# Patient Record
Sex: FEMALE | Race: WHITE | URBAN - METROPOLITAN AREA
[De-identification: names, ages, dates, MRNs, and addresses within clinical notes are randomized per-mention and may not be internally consistent; named-entity substitution may affect disease eponyms.]

---
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
## 2019-03-15 ENCOUNTER — WALK IN (OUTPATIENT)
Dept: URGENT CARE | Age: 19
End: 2019-03-15

## 2019-03-15 VITALS
DIASTOLIC BLOOD PRESSURE: 68 MMHG | BODY MASS INDEX: 27.6 KG/M2 | TEMPERATURE: 98.3 F | WEIGHT: 150 LBS | SYSTOLIC BLOOD PRESSURE: 118 MMHG | RESPIRATION RATE: 18 BRPM | HEART RATE: 72 BPM | HEIGHT: 62 IN

## 2019-03-15 DIAGNOSIS — H10.023 PINK EYE DISEASE OF BOTH EYES: ICD-10-CM

## 2019-03-15 DIAGNOSIS — H66.92 LEFT OTITIS MEDIA, UNSPECIFIED OTITIS MEDIA TYPE: Primary | ICD-10-CM

## 2019-03-15 PROCEDURE — 99203 OFFICE O/P NEW LOW 30 MIN: CPT | Performed by: NURSE PRACTITIONER

## 2019-03-15 RX ORDER — GENTAMICIN SULFATE 3 MG/ML
1 SOLUTION/ DROPS OPHTHALMIC 3 TIMES DAILY
Qty: 5 ML | Refills: 0 | Status: SHIPPED | OUTPATIENT
Start: 2019-03-15 | End: 2019-03-20

## 2019-03-15 RX ORDER — AZITHROMYCIN 250 MG/1
TABLET, FILM COATED ORAL
Qty: 6 TABLET | Refills: 0 | Status: SHIPPED | OUTPATIENT
Start: 2019-03-15 | End: 2019-03-20

## 2019-03-15 ASSESSMENT — ENCOUNTER SYMPTOMS
CONSTITUTIONAL NEGATIVE: 1
GASTROINTESTINAL NEGATIVE: 1
NEUROLOGICAL NEGATIVE: 1
COUGH: 1
PHOTOPHOBIA: 0
EYE REDNESS: 1
EYE DISCHARGE: 1

## 2019-08-13 ENCOUNTER — OFFICE VISIT (OUTPATIENT)
Dept: FAMILY MEDICINE CLINIC | Facility: CLINIC | Age: 19
End: 2019-08-13
Payer: COMMERCIAL

## 2019-08-13 VITALS
RESPIRATION RATE: 14 BRPM | TEMPERATURE: 98 F | BODY MASS INDEX: 26.82 KG/M2 | HEIGHT: 62.5 IN | HEART RATE: 80 BPM | DIASTOLIC BLOOD PRESSURE: 74 MMHG | SYSTOLIC BLOOD PRESSURE: 110 MMHG | WEIGHT: 149.5 LBS

## 2019-08-13 DIAGNOSIS — L23.9 ALLERGIC DERMATITIS: Primary | ICD-10-CM

## 2019-08-13 PROCEDURE — 99213 OFFICE O/P EST LOW 20 MIN: CPT | Performed by: NURSE PRACTITIONER

## 2019-08-13 RX ORDER — NORETHINDRONE ACETATE AND ETHINYL ESTRADIOL AND FERROUS FUMARATE 1MG-20(21)
1 KIT ORAL DAILY
Refills: 2 | COMMUNITY
Start: 2019-07-21

## 2019-08-13 NOTE — PROGRESS NOTES
Augie Rodriguez is a 20year old female.   Patient presents with:  Rash: Started in scalp/eyes, now in armpits - keeps spreading      HPI:   Complaints of rash for the past 2 years=- has moved around- I now on her armpits-   Is itchy- charles if seaty   Has had it o apparent distress  SKIN: Axilla–mild, confluent erythematous rash to axilla bilaterally–no satellite lesions, no signs of infection, no discharge.    LUNGS: normal rate without respiratory distress, lungs clear to auscultation  CARDIO: RRR without murmur, n

## 2019-08-13 NOTE — PATIENT INSTRUCTIONS
Apply cream sparingly to rash areas- do not use on your face,     Follow up if symptoms persist or increase

## 2019-11-11 ENCOUNTER — OFFICE VISIT (OUTPATIENT)
Dept: FAMILY MEDICINE CLINIC | Facility: CLINIC | Age: 19
End: 2019-11-11
Payer: COMMERCIAL

## 2019-11-11 VITALS
HEART RATE: 114 BPM | OXYGEN SATURATION: 96 % | RESPIRATION RATE: 16 BRPM | TEMPERATURE: 100 F | BODY MASS INDEX: 27 KG/M2 | DIASTOLIC BLOOD PRESSURE: 64 MMHG | SYSTOLIC BLOOD PRESSURE: 110 MMHG | WEIGHT: 148.63 LBS

## 2019-11-11 DIAGNOSIS — J02.9 SORE THROAT: Primary | ICD-10-CM

## 2019-11-11 PROCEDURE — 87880 STREP A ASSAY W/OPTIC: CPT | Performed by: NURSE PRACTITIONER

## 2019-11-11 PROCEDURE — 99214 OFFICE O/P EST MOD 30 MIN: CPT | Performed by: NURSE PRACTITIONER

## 2019-11-11 RX ORDER — AZITHROMYCIN 500 MG/1
500 TABLET, FILM COATED ORAL DAILY
Qty: 5 TABLET | Refills: 0 | Status: SHIPPED | OUTPATIENT
Start: 2019-11-11 | End: 2019-11-16

## 2019-11-11 NOTE — PROGRESS NOTES
HPI:    Patient ID: Anya Mark is a 20year old female. Patient presents to the clinic today with complaints of a sore throat and enlarged tonsils. Symptoms started  Saturday night with a sore throat and worsening pain.  Reports low energy, low appetite, dry.   Psychiatric: She has a normal mood and affect. Her behavior is normal.   Nursing note and vitals reviewed.              ASSESSMENT/PLAN:   Sore throat  (primary encounter diagnosis)    Orders Placed This Encounter      Rapid Strep      Meds This Visi

## 2019-11-11 NOTE — PATIENT INSTRUCTIONS
Take acetaminophen or ibuprofen for fever/discomfort  Drink plenty of fluids, warm liquids  Decongestants for congestion  Expectorant and/or cough suppressant  Use saline drops as needed or use neti pot/rinse (with distilled water)  Use cool mist vaporiz · Symptoms that get worse or new symptoms   · Symptoms that go away and come back  · Trouble swallowing  · Inability to eat or drink, or refusing food and drink  · Trouble breathing  · Excessive drooling  · Trouble opening the mouth  · Neck stiffness  · Bl Side effects that you should report to your doctor or health care professional as soon as possible:  · allergic reactions like skin rash, itching or hives, swelling of the face, lips, or tongue  · bloody or watery diarrhea  · breathing problems  · chest pa · liver disease  · myasthenia gravis  · an unusual or allergic reaction to azithromycin, erythromycin, other macrolide antibiotics, foods, dyes, or preservatives  · pregnant or trying to get pregnant  · breast-feeding  What should I watch for while using t

## 2020-01-29 ENCOUNTER — OFFICE VISIT (OUTPATIENT)
Dept: FAMILY MEDICINE CLINIC | Facility: CLINIC | Age: 20
End: 2020-01-29
Payer: COMMERCIAL

## 2020-01-29 VITALS
DIASTOLIC BLOOD PRESSURE: 78 MMHG | TEMPERATURE: 99 F | RESPIRATION RATE: 16 BRPM | HEIGHT: 62.5 IN | BODY MASS INDEX: 26.74 KG/M2 | WEIGHT: 149 LBS | SYSTOLIC BLOOD PRESSURE: 94 MMHG | HEART RATE: 106 BPM

## 2020-01-29 DIAGNOSIS — K52.9 GASTROENTERITIS: Primary | ICD-10-CM

## 2020-01-29 PROCEDURE — 99213 OFFICE O/P EST LOW 20 MIN: CPT | Performed by: NURSE PRACTITIONER

## 2020-01-29 RX ORDER — CICLOPIROX 1 G/100ML
SHAMPOO TOPICAL
Refills: 1 | COMMUNITY
Start: 2019-10-22 | End: 2021-09-22

## 2020-01-29 RX ORDER — ONDANSETRON 4 MG/1
4 TABLET, FILM COATED ORAL EVERY 8 HOURS PRN
Qty: 10 TABLET | Refills: 0 | Status: SHIPPED | OUTPATIENT
Start: 2020-01-29 | End: 2020-03-03

## 2020-01-29 RX ORDER — MOMETASONE FUROATE 1 MG/G
CREAM TOPICAL
Refills: 0 | COMMUNITY
Start: 2019-10-22 | End: 2020-08-21

## 2020-01-29 RX ORDER — FLUOCINONIDE 0.5 MG/ML
SOLUTION TOPICAL
Refills: 1 | COMMUNITY
Start: 2019-10-22 | End: 2021-09-22

## 2020-01-29 NOTE — PATIENT INSTRUCTIONS
Your vomiting and diarrhea are most likely viral.  Important that you let your body get rid of all the toxin, over-the-counter antidiarrheals as this may make your condition worse.   I have prescribed you Zofran, please only use this if nausea becomes Denice Mar-Mac and Connie Follow all instructions given by your healthcare provider. Rest at home for the next 24 hours, or until you feel better. Avoid caffeine, tobacco, and alcohol. These can make diarrhea, cramping, and pain worse.   If taking medicines:  · Over-the-counter naus · Don’t eat raw or undercooked eggs (poached or abdulaziz side up), poultry, meat, or unpasteurized milk and juices. Food and drinks  The main goal while treating vomiting or diarrhea is to prevent dehydration.  This is done by taking small amounts of liquids Follow up with your healthcare provider, or as advised. If a stool sample was taken or cultures were done, call the healthcare provider for the results as instructed.   Call 911  Call 911 if you have any of these symptoms:  · Trouble breathing  · Confusion

## 2020-01-29 NOTE — PROGRESS NOTES
HPI:    Patient ID: Radha Crocker is a 20year old female. Patient presents to the clinic today with complaints of vomiting and diarrhea. States that she has been vomiting since last night, has vomited 10 times, Diarrhea about 8 times.   Has some abdominal needed. 0   • Ondansetron HCl (ZOFRAN) 4 mg tablet Take 1 tablet (4 mg total) by mouth every 8 (eight) hours as needed for Nausea. 10 tablet 0   • JUNEL FE 1/20 1-20 MG-MCG Oral Tab Take 1 tablet by mouth daily.   2     Allergies:  Amoxicillin hours as needed for Nausea.        Imaging & Referrals:  None       [id number]

## 2020-02-26 ENCOUNTER — OFFICE VISIT (OUTPATIENT)
Dept: FAMILY MEDICINE CLINIC | Facility: CLINIC | Age: 20
End: 2020-02-26
Payer: COMMERCIAL

## 2020-02-26 VITALS
RESPIRATION RATE: 16 BRPM | HEIGHT: 62.5 IN | WEIGHT: 148 LBS | DIASTOLIC BLOOD PRESSURE: 62 MMHG | HEART RATE: 88 BPM | SYSTOLIC BLOOD PRESSURE: 100 MMHG | OXYGEN SATURATION: 95 % | TEMPERATURE: 97 F | BODY MASS INDEX: 26.55 KG/M2

## 2020-02-26 DIAGNOSIS — L73.9 FOLLICULITIS: Primary | ICD-10-CM

## 2020-02-26 PROCEDURE — 99213 OFFICE O/P EST LOW 20 MIN: CPT | Performed by: NURSE PRACTITIONER

## 2020-02-26 RX ORDER — SULFAMETHOXAZOLE AND TRIMETHOPRIM 800; 160 MG/1; MG/1
1 TABLET ORAL 2 TIMES DAILY
COMMUNITY
Start: 2020-02-23 | End: 2020-03-03 | Stop reason: ALTCHOICE

## 2020-02-26 NOTE — PATIENT INSTRUCTIONS
Use bacitracin topically at least twice a day. If not improving, consider a course of Valtrex. Otherwise follow-up as needed.

## 2020-02-26 NOTE — PROGRESS NOTES
HPI:    Patient ID: Shyam Rodriguez is a 20year old female. HPI     Rash to the pelvic area since Saturday or Sunday. Rash is burning not itching. Notes the burning with urination as well as when touched. No hx of cold sores. Urine has been cloudy.  Was No prescriptions requested or ordered in this encounter       Imaging & Referrals:  None      Patient Instructions   Use bacitracin topically at least twice a day. If not improving, consider a course of Valtrex. Otherwise follow-up as needed.

## 2020-03-03 ENCOUNTER — OFFICE VISIT (OUTPATIENT)
Dept: FAMILY MEDICINE CLINIC | Facility: CLINIC | Age: 20
End: 2020-03-03
Payer: COMMERCIAL

## 2020-03-03 ENCOUNTER — TELEPHONE (OUTPATIENT)
Dept: FAMILY MEDICINE CLINIC | Facility: CLINIC | Age: 20
End: 2020-03-03

## 2020-03-03 VITALS
WEIGHT: 147.38 LBS | OXYGEN SATURATION: 98 % | DIASTOLIC BLOOD PRESSURE: 70 MMHG | HEART RATE: 92 BPM | HEIGHT: 62.5 IN | SYSTOLIC BLOOD PRESSURE: 120 MMHG | BODY MASS INDEX: 26.44 KG/M2 | RESPIRATION RATE: 16 BRPM | TEMPERATURE: 98 F

## 2020-03-03 DIAGNOSIS — L50.9 HIVES: ICD-10-CM

## 2020-03-03 DIAGNOSIS — T78.40XA ALLERGIC REACTION TO DRUG, INITIAL ENCOUNTER: Primary | ICD-10-CM

## 2020-03-03 PROCEDURE — 99214 OFFICE O/P EST MOD 30 MIN: CPT | Performed by: FAMILY MEDICINE

## 2020-03-03 RX ORDER — PREDNISONE 20 MG/1
20 TABLET ORAL 2 TIMES DAILY
Qty: 10 TABLET | Refills: 0 | Status: SHIPPED | OUTPATIENT
Start: 2020-03-03 | End: 2020-03-08

## 2020-03-03 NOTE — TELEPHONE ENCOUNTER
poss med reaction to antibiotic prescribed by Urgent care 2/23    [facility] /  lulu       broke out in hives and now worsening  - stopped taking RX 3/1 - broke out on Monday 3/2

## 2020-03-03 NOTE — TELEPHONE ENCOUNTER
Pt was seen at urgent care on 2/23- treated for UTI. Pt states she was given Bactrim-  One week course. Pt then was seen per CS- on 1/26- seen for folliculitis-  Pt states she was told to complete course of antx.   Pt states she finished her antx on Sun

## 2020-03-03 NOTE — PATIENT INSTRUCTIONS
Allergic reaction-- SULFA, Bactrum    rec claritin once a  Morning    rec prednisone for 5 days    Hydrate well    Ok for benadryl at bedtime if needed for itching

## 2020-03-04 NOTE — PROGRESS NOTES
Dariel Pickett is a 20year old female. Patient presents with:  Hives: chest,legs,arms,back areas  Redness: face      HPI:   Patient presents for evaluation of a rash. Patient initially treated at urgent care with Bactrim.   Patient developed a rash on her Yes Yes/No    Kit Lot # [id number] Numeric    Kit Expiration Date 2/28/2021 Date     REVIEW OF SYSTEMS:   GENERAL HEALTH: feels well otherwise  SKIN  see HPI  RESPIRATORY: denies cough or shortness of breath  CARDIOVASCULAR: denies chest pain  GI: denies a

## 2020-07-08 ENCOUNTER — TELEPHONE (OUTPATIENT)
Dept: FAMILY MEDICINE CLINIC | Facility: CLINIC | Age: 20
End: 2020-07-08

## 2020-07-08 NOTE — TELEPHONE ENCOUNTER
Spoke with pt. Pt interested in starting her HPV series. Pt is now 20- pt informed that she would need to have exam and pap. Pt informed vaccine to be discussed at time of appt. Pt verbalized understanding.     Future Appointments   Date Time Provider D

## 2020-08-21 ENCOUNTER — OFFICE VISIT (OUTPATIENT)
Dept: FAMILY MEDICINE CLINIC | Facility: CLINIC | Age: 20
End: 2020-08-21
Payer: COMMERCIAL

## 2020-08-21 VITALS
DIASTOLIC BLOOD PRESSURE: 70 MMHG | WEIGHT: 160.81 LBS | HEART RATE: 108 BPM | RESPIRATION RATE: 16 BRPM | BODY MASS INDEX: 28.85 KG/M2 | TEMPERATURE: 99 F | OXYGEN SATURATION: 98 % | SYSTOLIC BLOOD PRESSURE: 112 MMHG | HEIGHT: 62.5 IN

## 2020-08-21 DIAGNOSIS — Z30.41 ENCOUNTER FOR SURVEILLANCE OF CONTRACEPTIVE PILLS: ICD-10-CM

## 2020-08-21 DIAGNOSIS — Z00.00 ANNUAL PHYSICAL EXAM: Primary | ICD-10-CM

## 2020-08-21 DIAGNOSIS — Z23 NEED FOR VACCINATION: ICD-10-CM

## 2020-08-21 PROCEDURE — 90471 IMMUNIZATION ADMIN: CPT | Performed by: NURSE PRACTITIONER

## 2020-08-21 PROCEDURE — 3078F DIAST BP <80 MM HG: CPT | Performed by: NURSE PRACTITIONER

## 2020-08-21 PROCEDURE — 90471 IMMUNIZATION ADMIN: CPT | Performed by: FAMILY MEDICINE

## 2020-08-21 PROCEDURE — 99395 PREV VISIT EST AGE 18-39: CPT | Performed by: FAMILY MEDICINE

## 2020-08-21 PROCEDURE — 90715 TDAP VACCINE 7 YRS/> IM: CPT | Performed by: FAMILY MEDICINE

## 2020-08-21 PROCEDURE — 90651 9VHPV VACCINE 2/3 DOSE IM: CPT | Performed by: NURSE PRACTITIONER

## 2020-08-21 PROCEDURE — 3074F SYST BP LT 130 MM HG: CPT | Performed by: NURSE PRACTITIONER

## 2020-08-21 PROCEDURE — 3008F BODY MASS INDEX DOCD: CPT | Performed by: NURSE PRACTITIONER

## 2020-08-21 PROCEDURE — 90472 IMMUNIZATION ADMIN EACH ADD: CPT | Performed by: NURSE PRACTITIONER

## 2020-08-21 NOTE — PROGRESS NOTES
CC: Annual Physical Exam    HPI:   Sudha Claudio is a 23year old female who presents for a complete physical exam.    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 Weight as of this encounter: 160 lb 12.8 oz (72.9 kg). Vital signs reviewed. Appears stated age, well groomed.   Physical Exa diagnosis)  Need for vaccination  Encounter for surveillance of contraceptive pills    Patient Instructions   rec 2 vaccines today-- HPV and Tdap    Continue yearly gyne care               Discussed diet and exercise.     Pt' s weight is Body mass index is

## 2020-08-24 ENCOUNTER — TELEPHONE (OUTPATIENT)
Dept: FAMILY MEDICINE CLINIC | Facility: CLINIC | Age: 20
End: 2020-08-24

## 2020-08-24 NOTE — TELEPHONE ENCOUNTER
Pt had wellness exam on 8/21/20. HPV #1 vaccine given at that time. pt will need 3 dose series. Order request noted for appointment for second injection.     Future Appointments   Date Time Provider Shannon Carrasco   10/20/2020 11:00 AM MARCO A GIVENS

## 2020-08-24 NOTE — TELEPHONE ENCOUNTER
Pt was seen for wellness visit with CR on 8/21/20. Was not able to find record of third Hep B vaccine. Discussed further with CR. Pt given option to have titer drawn or can get Hep B vaccine.     Pt contacted- states she will discuss with her mother an

## 2020-08-31 ENCOUNTER — TELEPHONE (OUTPATIENT)
Dept: FAMILY MEDICINE CLINIC | Facility: CLINIC | Age: 20
End: 2020-08-31

## 2020-08-31 NOTE — TELEPHONE ENCOUNTER
Patient states that she needs a hep B shot as well, has an appt October for 2nd HPV. Would like to get hep b shot that same day as well.

## 2020-10-16 ENCOUNTER — OFFICE VISIT (OUTPATIENT)
Dept: FAMILY MEDICINE CLINIC | Facility: CLINIC | Age: 20
End: 2020-10-16
Payer: COMMERCIAL

## 2020-10-16 ENCOUNTER — LAB ENCOUNTER (OUTPATIENT)
Dept: LAB | Age: 20
End: 2020-10-16
Attending: FAMILY MEDICINE
Payer: COMMERCIAL

## 2020-10-16 VITALS
RESPIRATION RATE: 16 BRPM | OXYGEN SATURATION: 97 % | SYSTOLIC BLOOD PRESSURE: 110 MMHG | WEIGHT: 156.38 LBS | TEMPERATURE: 98 F | HEIGHT: 62.5 IN | HEART RATE: 86 BPM | BODY MASS INDEX: 28.06 KG/M2 | DIASTOLIC BLOOD PRESSURE: 70 MMHG

## 2020-10-16 DIAGNOSIS — K92.1 BLOODY STOOLS: ICD-10-CM

## 2020-10-16 DIAGNOSIS — R10.84 GENERALIZED ABDOMINAL PAIN: Primary | ICD-10-CM

## 2020-10-16 DIAGNOSIS — R10.84 GENERALIZED ABDOMINAL PAIN: ICD-10-CM

## 2020-10-16 PROCEDURE — 82550 ASSAY OF CK (CPK): CPT | Performed by: FAMILY MEDICINE

## 2020-10-16 PROCEDURE — 82784 ASSAY IGA/IGD/IGG/IGM EACH: CPT | Performed by: FAMILY MEDICINE

## 2020-10-16 PROCEDURE — 83516 IMMUNOASSAY NONANTIBODY: CPT | Performed by: FAMILY MEDICINE

## 2020-10-16 PROCEDURE — 83540 ASSAY OF IRON: CPT | Performed by: FAMILY MEDICINE

## 2020-10-16 PROCEDURE — 82306 VITAMIN D 25 HYDROXY: CPT | Performed by: FAMILY MEDICINE

## 2020-10-16 PROCEDURE — 84550 ASSAY OF BLOOD/URIC ACID: CPT | Performed by: FAMILY MEDICINE

## 2020-10-16 PROCEDURE — 86256 FLUORESCENT ANTIBODY TITER: CPT | Performed by: FAMILY MEDICINE

## 2020-10-16 PROCEDURE — 36415 COLL VENOUS BLD VENIPUNCTURE: CPT | Performed by: FAMILY MEDICINE

## 2020-10-16 PROCEDURE — 3078F DIAST BP <80 MM HG: CPT | Performed by: FAMILY MEDICINE

## 2020-10-16 PROCEDURE — 82728 ASSAY OF FERRITIN: CPT | Performed by: FAMILY MEDICINE

## 2020-10-16 PROCEDURE — 80061 LIPID PANEL: CPT | Performed by: FAMILY MEDICINE

## 2020-10-16 PROCEDURE — 83550 IRON BINDING TEST: CPT | Performed by: FAMILY MEDICINE

## 2020-10-16 PROCEDURE — 81001 URINALYSIS AUTO W/SCOPE: CPT | Performed by: FAMILY MEDICINE

## 2020-10-16 PROCEDURE — 3008F BODY MASS INDEX DOCD: CPT | Performed by: FAMILY MEDICINE

## 2020-10-16 PROCEDURE — 82607 VITAMIN B-12: CPT | Performed by: FAMILY MEDICINE

## 2020-10-16 PROCEDURE — 99214 OFFICE O/P EST MOD 30 MIN: CPT | Performed by: FAMILY MEDICINE

## 2020-10-16 PROCEDURE — 3074F SYST BP LT 130 MM HG: CPT | Performed by: FAMILY MEDICINE

## 2020-10-16 PROCEDURE — 80050 GENERAL HEALTH PANEL: CPT | Performed by: FAMILY MEDICINE

## 2020-10-16 NOTE — PROGRESS NOTES
[facility]        HPI:   This is a 23year old female coming in for Patient presents with:  Abdominal Pain: Cramps, blood in stool. Started the beginning of this week.  the third time using the bathroom, there was nothing b contraceptive pills      REVIEW OF SYSTEMS:   Review of Systems    EXAM:   /70   Pulse 86   Temp 98.2 °F (36.8 °C) (Temporal)   Resp 16   Ht 62.5\"   Wt 156 lb 6.4 oz (70.9 kg)   LMP 10/07/2020 (Exact Date)   SpO2 97%   BMI 28.15 kg/m²  Estimated bod visit:    Generalized abdominal pain  -     CBC WITH DIFFERENTIAL WITH PLATELET; Future  -     CK CREATINE KINASE (NOT CREATININE); Future  -     COMP METABOLIC PANEL (14); Future  -     LIPID PANEL; Future  -     FERRITIN; Future  -     IRON AND TIBC;  [address] ([facility])            [address] [facility]  [id number] [phone]        No follow-ups on natalia Vaccine 9 Gwen Im                          08/24/2020      \" This note was created utilizing Dragon speech recognition software. Please excuse any grammatical errors. Call my office if you have any questions regarding this note.  \"

## 2020-10-19 ENCOUNTER — TELEPHONE (OUTPATIENT)
Dept: FAMILY MEDICINE CLINIC | Facility: CLINIC | Age: 20
End: 2020-10-19

## 2020-10-19 NOTE — TELEPHONE ENCOUNTER
----- Message from Nu Hummel MD sent at 10/18/2020 10:24 AM CDT -----  Cmp, thyroid, cholesterol and iron all look ok. Vitamin B12 is subtherapeutic.   recommend \"B complex\" and recheck level in 6 months   Vitamin D is sub therapeutic.   Recommend

## 2020-10-19 NOTE — TELEPHONE ENCOUNTER
----- Message from Romel Lopez MD sent at 10/16/2020  7:18 PM CDT -----  Non concerning urine   Contaminated. Recommend increase fluids.

## 2020-10-19 NOTE — TELEPHONE ENCOUNTER
Patient is active on [employer] and has reviewed the below results and recommendations from Dr. Jodee Palomo via [facility] on 10/18/20 at [phone]. Hold for rest of results.

## 2020-10-20 ENCOUNTER — NURSE ONLY (OUTPATIENT)
Dept: FAMILY MEDICINE CLINIC | Facility: CLINIC | Age: 20
End: 2020-10-20
Payer: COMMERCIAL

## 2020-10-20 VITALS — TEMPERATURE: 98 F

## 2020-10-20 DIAGNOSIS — Z23 NEED FOR VACCINATION: ICD-10-CM

## 2020-10-20 PROCEDURE — 90746 HEPB VACCINE 3 DOSE ADULT IM: CPT | Performed by: FAMILY MEDICINE

## 2020-10-20 PROCEDURE — 90471 IMMUNIZATION ADMIN: CPT | Performed by: FAMILY MEDICINE

## 2020-10-20 PROCEDURE — 90472 IMMUNIZATION ADMIN EACH ADD: CPT | Performed by: FAMILY MEDICINE

## 2020-10-20 PROCEDURE — 90651 9VHPV VACCINE 2/3 DOSE IM: CPT | Performed by: FAMILY MEDICINE

## 2020-10-20 NOTE — PROGRESS NOTES
Patient here for Hep B #3 and HPV # 2 as ordered by Dr. Kanu Pugh  Patient denies previous vaccine allergies or reactions. Hep B # 3 given IM left deltoid. HPV # 2 given IM right deltoid. Vaccines well tolerated by patient.      Vaccine information

## 2020-11-12 NOTE — TELEPHONE ENCOUNTER
Pt calling and states she never received info on whether or not her labs are ok. Pt states she was told she might have to go see a GI doc but no one gave her info on if that was the case or where to go. Pt would like a c/b.

## 2020-11-12 NOTE — TELEPHONE ENCOUNTER
Reviewed lab results with patient again. Patient states she is having no further rectal bleeding or other symptoms and is comfortable not following up with a specialist.    Patient will call back if symptoms return.

## 2021-01-04 ENCOUNTER — TELEPHONE (OUTPATIENT)
Dept: FAMILY MEDICINE CLINIC | Facility: CLINIC | Age: 21
End: 2021-01-04

## 2021-01-04 NOTE — TELEPHONE ENCOUNTER
Ashley Banuelos  verbally consents to a Virtual/Telephone Check-In service on 1/4/2021. Patient understands and accepts financial responsibility for any deductible, co-insurance and/or co-pays associated with this service.   .  Future Appointments   Date T

## 2021-01-05 ENCOUNTER — TELEMEDICINE (OUTPATIENT)
Dept: FAMILY MEDICINE CLINIC | Facility: CLINIC | Age: 21
End: 2021-01-05
Payer: COMMERCIAL

## 2021-01-05 VITALS — TEMPERATURE: 98 F

## 2021-01-05 DIAGNOSIS — Z20.822 CLOSE EXPOSURE TO COVID-19 VIRUS: Primary | ICD-10-CM

## 2021-01-05 LAB — AMB EXT COVID-19 RESULT: DETECTED

## 2021-01-05 PROCEDURE — 99213 OFFICE O/P EST LOW 20 MIN: CPT | Performed by: NURSE PRACTITIONER

## 2021-01-05 RX ORDER — MOMETASONE FUROATE 1 MG/G
CREAM TOPICAL
COMMUNITY
Start: 2020-11-07 | End: 2021-09-22

## 2021-01-05 RX ORDER — NORETHINDRONE ACETATE AND ETHINYL ESTRADIOL 1; .02 MG/1; MG/1
TABLET ORAL
COMMUNITY
Start: 2020-12-19

## 2021-01-05 RX ORDER — HYDROCODONE BITARTRATE AND ACETAMINOPHEN 5; 325 MG/1; MG/1
TABLET ORAL
COMMUNITY
Start: 2020-12-03 | End: 2021-01-05 | Stop reason: ALTCHOICE

## 2021-01-05 NOTE — PATIENT INSTRUCTIONS
Continue to monitor for symptoms. Stay home while waiting for test results to come back. Recommend you download the [facility] bo for quicker results. Tylenol as needed for any body aches, headache or fever.     Report to the ER or ca by reducing the time they cannot work. A shorter quarantine period also can lessen stress on the public health system, especially when new infections are rapidly rising.   Your local public health authorities make the final decisions about how long Galo Alvarez contains at least 60% alcohol. 8. As much as possible, stay in a specific room and away from other people in your home. Also, you should use a separate bathroom, if available.  If you need to be around other people in or outside of the home, wear a facema positive for COVID-19, you should also stay home and away from others for a total of 10 days after your symptoms started, and at least 24 hours after your fever is gone and symptoms are getting better, whichever is longer.   Patients with pending COVID-19 t you’re instructed by Omaira Triana that a repeat test is required, please contact the [facility] COVID-19 Nurse Triage Line at [phone].     Additional Information      You can also get more information at the following websites:   [facility]

## 2021-01-05 NOTE — PROGRESS NOTES
HPI:    Patient ID: Gary Thao is a 23year old female. Eloise Banuelos  verbally consents to a Virtual Check-In service on 1/5/2021.     Patient understands and accepts financial responsibility for any deductible, co-insurance and/or co-pays associated w Testing by PCR ([facility]) [E]      Meds This Visit:  Requested Prescriptions      No prescriptions requested or ordered in this encounter       Imaging & Referrals:  None       AQ#[id number]

## 2021-01-08 ENCOUNTER — TELEPHONE (OUTPATIENT)
Dept: FAMILY MEDICINE CLINIC | Facility: CLINIC | Age: 21
End: 2021-01-08

## 2021-01-08 NOTE — TELEPHONE ENCOUNTER
Received positive covid-19 test result via fax from [facility]. Chart updated. Left message for patient to call office back.

## 2021-01-08 NOTE — TELEPHONE ENCOUNTER
Patient returned call and was informed of her results. Patient is still asymptomatic. Patient given CDC guidelines on isolation and when she can return to the public. Pt v/u and had no further questions at this time.

## 2021-02-22 ENCOUNTER — TELEPHONE (OUTPATIENT)
Dept: FAMILY MEDICINE CLINIC | Facility: CLINIC | Age: 21
End: 2021-02-22

## 2021-02-22 NOTE — TELEPHONE ENCOUNTER
Pt had wellness visit on 8/21/20  First dose of HPV given 8/21/20, second dose was given 10/20/20. Pt is due for third vaccine injection. Future HPV order noted on file under order review. To await appt.     Future Appointments   Date Time Provider Dep

## 2021-02-22 NOTE — TELEPHONE ENCOUNTER
----- Message from Maria Eugenia Zavala sent at 2/22/2021  1:19 PM CST -----  Pt has appt for HPV tomorrow but there are no orders.

## 2021-02-23 ENCOUNTER — NURSE ONLY (OUTPATIENT)
Dept: FAMILY MEDICINE CLINIC | Facility: CLINIC | Age: 21
End: 2021-02-23
Payer: COMMERCIAL

## 2021-02-23 DIAGNOSIS — Z23 NEED FOR VACCINATION: ICD-10-CM

## 2021-02-23 PROCEDURE — 90471 IMMUNIZATION ADMIN: CPT | Performed by: FAMILY MEDICINE

## 2021-02-23 PROCEDURE — 90651 9VHPV VACCINE 2/3 DOSE IM: CPT | Performed by: FAMILY MEDICINE

## 2021-02-23 NOTE — PROGRESS NOTES
Patient here for third HPV vaccine. Patient denies any previous vaccine allergies or acute reactions    HPV # 3 given left deltoid. Well tolerated by patient. Vaccine information sheet given to patient.  Patient informed that this completes the HPV

## 2021-04-27 ENCOUNTER — TELEPHONE (OUTPATIENT)
Dept: FAMILY MEDICINE CLINIC | Facility: CLINIC | Age: 21
End: 2021-04-27

## 2021-04-27 NOTE — TELEPHONE ENCOUNTER
Spoke with pt earlier today. Pt tested positive to covid yesterday and had exposure to a positive covid individual last week. Pt called back to see if she can get tested again since pt has no s/s.     Pt informed that she will still need to quarantine f

## 2021-04-27 NOTE — TELEPHONE ENCOUNTER
Tested positive in jan - And now tested positive yesterday - has some questions about COVID  - Please advise

## 2021-04-27 NOTE — TELEPHONE ENCOUNTER
Pt tested positive for covid on 1/5. Pt states she found out that her friend tested positive on Saturday. Pt tested yesterday and was positive again.(pt is past the 90 days from time of first positive)  Pt has no s/s.     Pt advised to quarantine for

## 2021-09-17 ENCOUNTER — TELEPHONE (OUTPATIENT)
Dept: FAMILY MEDICINE CLINIC | Facility: CLINIC | Age: 21
End: 2021-09-17

## 2021-09-17 NOTE — TELEPHONE ENCOUNTER
pt just got her vaccine and needs weekly testing for the next weeks per school- would like an order sent to [facility] [place]

## 2021-09-17 NOTE — TELEPHONE ENCOUNTER
She would need to do testing through her employer, unfortunately there is a shortage of testing supply we are not to test asymptomatic people.

## 2021-09-17 NOTE — TELEPHONE ENCOUNTER
Pt had her first covid vaccine on 9/3, pt will be due for second vaccine on 9/24. Pt states she works for the [employer] based out of [employer]. 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   Pt states she

## 2021-09-21 ENCOUNTER — TELEPHONE (OUTPATIENT)
Dept: FAMILY MEDICINE CLINIC | Facility: CLINIC | Age: 21
End: 2021-09-21

## 2021-09-21 NOTE — TELEPHONE ENCOUNTER
needs a Covid order to be tested, symptoms:  sore throat, chest congestion, hot and clammy,   Mother did at home test, came out positive, Mom did a psr test at [facility], waiting on results  No future appointments.

## 2021-09-21 NOTE — TELEPHONE ENCOUNTER
Spoke with patient. Scheduled on respiratory schedule tomorrow. She was not aware we did covid testing.

## 2021-09-22 ENCOUNTER — OFFICE VISIT (OUTPATIENT)
Dept: FAMILY MEDICINE CLINIC | Facility: CLINIC | Age: 21
End: 2021-09-22
Payer: COMMERCIAL

## 2021-09-22 VITALS
OXYGEN SATURATION: 99 % | HEART RATE: 84 BPM | SYSTOLIC BLOOD PRESSURE: 124 MMHG | DIASTOLIC BLOOD PRESSURE: 84 MMHG | TEMPERATURE: 97 F

## 2021-09-22 DIAGNOSIS — J06.9 VIRAL UPPER RESPIRATORY TRACT INFECTION: Primary | ICD-10-CM

## 2021-09-22 PROCEDURE — 3079F DIAST BP 80-89 MM HG: CPT | Performed by: NURSE PRACTITIONER

## 2021-09-22 PROCEDURE — 99213 OFFICE O/P EST LOW 20 MIN: CPT | Performed by: NURSE PRACTITIONER

## 2021-09-22 PROCEDURE — 3074F SYST BP LT 130 MM HG: CPT | Performed by: NURSE PRACTITIONER

## 2021-09-22 NOTE — PATIENT INSTRUCTIONS
Rest, increase fluids, salt water gargles ,neti pot (use distilled water) or saline nasal spray, Mucinex-DM, Advil cold and sinus (behind the counter), Alavert, Tylenol/Ibuprofen, follow up if symptoms persist or increase.       Ok to go back to work Monday

## 2021-09-22 NOTE — PROGRESS NOTES
CHIEF COMPLAINT:   Patient presents with:  Sore Throat  Fatigue  Nasal Congestion      HPI:   Natali Wu is a 24year old female who presents to clinic today with complaints of sore throat since Friday (9/17)- no fever,  Sore throat is getting better n rhonchi. Breathing is non labored.   CARDIO: RRR without murmur  SKIN: no rashes,no suspicious lesions  ASSESSMENT AND PLAN:   Mary Kay Cox is a 24year old female who presents with ear problems symptoms are consistent with  ASSESSMENT:  Viral upper respira

## 2021-09-23 LAB — SARS-COV-2 RNA RESP QL NAA+PROBE: NOT DETECTED

## 2021-09-24 ENCOUNTER — TELEPHONE (OUTPATIENT)
Dept: FAMILY MEDICINE CLINIC | Facility: CLINIC | Age: 21
End: 2021-09-24

## 2021-09-24 NOTE — TELEPHONE ENCOUNTER
----- Message from EMANI Paris sent at 9/24/2021  9:15 AM CDT -----  Patient's Covid PCR test was negative. Continue treatment as outlined by Jose Tran at office visit.

## 2022-03-16 ENCOUNTER — TELEPHONE (OUTPATIENT)
Dept: FAMILY MEDICINE CLINIC | Facility: CLINIC | Age: 22
End: 2022-03-16

## 2022-03-16 NOTE — TELEPHONE ENCOUNTER
Pt wants to get allergy testing done and to see a specialist. She wants to know who we recommend she has BCBS PPO and can go most places. She asks that we send her a [employer] message with the info when done. Please advise.

## 2023-07-28 ENCOUNTER — LAB ENCOUNTER (OUTPATIENT)
Dept: LAB | Age: 23
End: 2023-07-28
Payer: COMMERCIAL

## 2023-07-28 ENCOUNTER — OFFICE VISIT (OUTPATIENT)
Dept: FAMILY MEDICINE CLINIC | Facility: CLINIC | Age: 23
End: 2023-07-28
Payer: COMMERCIAL

## 2023-07-28 VITALS
TEMPERATURE: 98 F | HEIGHT: 62.5 IN | WEIGHT: 148 LBS | OXYGEN SATURATION: 97 % | RESPIRATION RATE: 16 BRPM | BODY MASS INDEX: 26.55 KG/M2 | DIASTOLIC BLOOD PRESSURE: 82 MMHG | SYSTOLIC BLOOD PRESSURE: 118 MMHG | HEART RATE: 85 BPM

## 2023-07-28 DIAGNOSIS — R10.84 GENERALIZED ABDOMINAL PAIN: Primary | ICD-10-CM

## 2023-07-28 DIAGNOSIS — Z00.00 ANNUAL PHYSICAL EXAM: ICD-10-CM

## 2023-07-28 DIAGNOSIS — K90.49 FOOD INTOLERANCE: ICD-10-CM

## 2023-07-28 DIAGNOSIS — T78.40XS ALLERGY, SEQUELA: ICD-10-CM

## 2023-07-28 DIAGNOSIS — F41.9 ANXIETY: ICD-10-CM

## 2023-07-28 LAB
ALBUMIN SERPL-MCNC: 4.3 G/DL (ref 3.4–5)
ALBUMIN/GLOB SERPL: 1.3 {RATIO} (ref 1–2)
ALP LIVER SERPL-CCNC: 52 U/L
ALT SERPL-CCNC: 21 U/L
ANION GAP SERPL CALC-SCNC: 9 MMOL/L (ref 0–18)
AST SERPL-CCNC: 17 U/L (ref 15–37)
BASOPHILS # BLD AUTO: 0.05 X10(3) UL (ref 0–0.2)
BASOPHILS NFR BLD AUTO: 0.7 %
BILIRUB SERPL-MCNC: 0.9 MG/DL (ref 0.1–2)
BILIRUB UR QL STRIP.AUTO: NEGATIVE
BUN BLD-MCNC: 8 MG/DL (ref 7–18)
CALCIUM BLD-MCNC: 9.2 MG/DL (ref 8.5–10.1)
CHLORIDE SERPL-SCNC: 107 MMOL/L (ref 98–112)
CHOLEST SERPL-MCNC: 187 MG/DL (ref ?–200)
CLARITY UR REFRACT.AUTO: CLEAR
CO2 SERPL-SCNC: 22 MMOL/L (ref 21–32)
CREAT BLD-MCNC: 0.64 MG/DL
EGFRCR SERPLBLD CKD-EPI 2021: 126 ML/MIN/1.73M2 (ref 60–?)
EOSINOPHIL # BLD AUTO: 0.07 X10(3) UL (ref 0–0.7)
EOSINOPHIL NFR BLD AUTO: 0.9 %
ERYTHROCYTE [DISTWIDTH] IN BLOOD BY AUTOMATED COUNT: 12.6 %
FASTING PATIENT LIPID ANSWER: YES
FASTING STATUS PATIENT QL REPORTED: YES
GLOBULIN PLAS-MCNC: 3.2 G/DL (ref 2.8–4.4)
GLUCOSE BLD-MCNC: 75 MG/DL (ref 70–99)
GLUCOSE UR STRIP.AUTO-MCNC: NEGATIVE MG/DL
HCT VFR BLD AUTO: 39.2 %
HDLC SERPL-MCNC: 69 MG/DL (ref 40–59)
HGB BLD-MCNC: 13.5 G/DL
IMM GRANULOCYTES # BLD AUTO: 0.02 X10(3) UL (ref 0–1)
IMM GRANULOCYTES NFR BLD: 0.3 %
KETONES UR STRIP.AUTO-MCNC: NEGATIVE MG/DL
LDLC SERPL CALC-MCNC: 108 MG/DL (ref ?–100)
LEUKOCYTE ESTERASE UR QL STRIP.AUTO: NEGATIVE
LYMPHOCYTES # BLD AUTO: 1.77 X10(3) UL (ref 1–4)
LYMPHOCYTES NFR BLD AUTO: 23.8 %
MCH RBC QN AUTO: 30.7 PG (ref 26–34)
MCHC RBC AUTO-ENTMCNC: 34.4 G/DL (ref 31–37)
MCV RBC AUTO: 89.1 FL
MONOCYTES # BLD AUTO: 0.44 X10(3) UL (ref 0.1–1)
MONOCYTES NFR BLD AUTO: 5.9 %
NEUTROPHILS # BLD AUTO: 5.08 X10 (3) UL (ref 1.5–7.7)
NEUTROPHILS # BLD AUTO: 5.08 X10(3) UL (ref 1.5–7.7)
NEUTROPHILS NFR BLD AUTO: 68.4 %
NITRITE UR QL STRIP.AUTO: NEGATIVE
NONHDLC SERPL-MCNC: 118 MG/DL (ref ?–130)
OSMOLALITY SERPL CALC.SUM OF ELEC: 283 MOSM/KG (ref 275–295)
PH UR STRIP.AUTO: 8 [PH] (ref 5–8)
PLATELET # BLD AUTO: 270 10(3)UL (ref 150–450)
POTASSIUM SERPL-SCNC: 3.7 MMOL/L (ref 3.5–5.1)
PROT SERPL-MCNC: 7.5 G/DL (ref 6.4–8.2)
PROT UR STRIP.AUTO-MCNC: NEGATIVE MG/DL
RBC # BLD AUTO: 4.4 X10(6)UL
SODIUM SERPL-SCNC: 138 MMOL/L (ref 136–145)
SP GR UR STRIP.AUTO: 1.01 (ref 1–1.03)
TRIGL SERPL-MCNC: 52 MG/DL (ref 30–149)
TSI SER-ACNC: 1.14 MIU/ML (ref 0.36–3.74)
UROBILINOGEN UR STRIP.AUTO-MCNC: <2 MG/DL
VLDLC SERPL CALC-MCNC: 9 MG/DL (ref 0–30)
WBC # BLD AUTO: 7.4 X10(3) UL (ref 4–11)

## 2023-07-28 PROCEDURE — 81001 URINALYSIS AUTO W/SCOPE: CPT

## 2023-07-28 PROCEDURE — 3008F BODY MASS INDEX DOCD: CPT

## 2023-07-28 PROCEDURE — 3079F DIAST BP 80-89 MM HG: CPT

## 2023-07-28 PROCEDURE — 99215 OFFICE O/P EST HI 40 MIN: CPT

## 2023-07-28 PROCEDURE — 80061 LIPID PANEL: CPT

## 2023-07-28 PROCEDURE — 82785 ASSAY OF IGE: CPT

## 2023-07-28 PROCEDURE — 80050 GENERAL HEALTH PANEL: CPT

## 2023-07-28 PROCEDURE — 3074F SYST BP LT 130 MM HG: CPT

## 2023-07-28 PROCEDURE — 86003 ALLG SPEC IGE CRUDE XTRC EA: CPT

## 2023-07-28 RX ORDER — LEVONORGESTREL 19.5 MG/1
1 INTRAUTERINE DEVICE INTRAUTERINE ONCE
COMMUNITY

## 2023-07-28 RX ORDER — FLUTICASONE PROPIONATE 50 MCG
1 SPRAY, SUSPENSION (ML) NASAL DAILY
Qty: 1 EACH | Refills: 0 | Status: SHIPPED | OUTPATIENT
Start: 2023-07-28

## 2023-07-28 RX ORDER — LORATADINE 10 MG/1
10 TABLET ORAL DAILY
Qty: 30 TABLET | Refills: 2 | Status: SHIPPED | OUTPATIENT
Start: 2023-07-28

## 2023-07-28 NOTE — PATIENT INSTRUCTIONS
Blood work today    Schedule CT abdomen at [facility] for your allergies    Flonase nasal spray daily    OTC nasal spray to keep nares moist.    Continue with omeprazole

## 2023-07-31 LAB
CLAM IGE QN: <0.1 KUA/L (ref ?–0.1)
CODFISH IGE QN: <0.1 KUA/L (ref ?–0.1)
CORN IGE QN: <0.1 KUA/L (ref ?–0.1)
COW MILK IGE QN: <0.1 KUA/L (ref ?–0.1)
EGG WHITE IGE QN: <0.1 KUA/L (ref ?–0.1)
IGE SERPL-ACNC: 76.7 KU/L (ref 2–214)
PEANUT IGE QN: <0.1 KUA/L (ref ?–0.1)
SCALLOP IGE QN: <0.1 KUA/L (ref ?–0.1)
SESAME SEED IGE QN: <0.1 KUA/L (ref ?–0.1)
SHRIMP IGE QN: <0.1 KUA/L (ref ?–0.1)
SOYBEAN IGE QN: <0.1 KUA/L (ref ?–0.1)
WALNUT IGE QN: <0.1 KUA/L (ref ?–0.1)
WHEAT IGE QN: <0.1 KUA/L (ref ?–0.1)

## (undated) NOTE — LETTER
Date: 11/11/2019    Patient Name: Giovanni Joyner          To Whom it may concern: This letter has been written at the patient's request. The above patient was seen at the [facility] for treatment of a medical condition.     This patient should

## (undated) NOTE — LETTER
Date: 1/29/2020    Patient Name: Anastacio Thompson          To Whom it may concern: The above patient was seen at the [facility] for treatment of a medical condition.     This patient should be excused from attending school 1/29/2020 - 1/30/2020

## (undated) NOTE — LETTER
Date: 3/3/2020    Patient Name: Gael Castañeda          To Whom it may concern: This letter has been written at the patient's request. The above patient was seen at the [facility] for treatment of a medical condition.     This patient should